# Patient Record
Sex: FEMALE | Race: BLACK OR AFRICAN AMERICAN | Employment: FULL TIME | URBAN - METROPOLITAN AREA
[De-identification: names, ages, dates, MRNs, and addresses within clinical notes are randomized per-mention and may not be internally consistent; named-entity substitution may affect disease eponyms.]

---

## 2018-09-25 ENCOUNTER — OFFICE VISIT (OUTPATIENT)
Dept: INTERNAL MEDICINE CLINIC | Age: 23
End: 2018-09-25
Payer: COMMERCIAL

## 2018-09-25 VITALS
SYSTOLIC BLOOD PRESSURE: 120 MMHG | OXYGEN SATURATION: 99 % | TEMPERATURE: 98.3 F | HEIGHT: 62 IN | BODY MASS INDEX: 32.39 KG/M2 | HEART RATE: 85 BPM | WEIGHT: 176 LBS | DIASTOLIC BLOOD PRESSURE: 82 MMHG

## 2018-09-25 DIAGNOSIS — K21.9 GASTROESOPHAGEAL REFLUX DISEASE, ESOPHAGITIS PRESENCE NOT SPECIFIED: Primary | ICD-10-CM

## 2018-09-25 DIAGNOSIS — K59.04 CHRONIC IDIOPATHIC CONSTIPATION: ICD-10-CM

## 2018-09-25 DIAGNOSIS — Z23 NEED FOR INFLUENZA VACCINATION: ICD-10-CM

## 2018-09-25 DIAGNOSIS — Z12.4 CERVICAL CANCER SCREENING: ICD-10-CM

## 2018-09-25 PROCEDURE — 99203 OFFICE O/P NEW LOW 30 MIN: CPT | Performed by: NURSE PRACTITIONER

## 2018-09-25 RX ORDER — POLYETHYLENE GLYCOL 3350 17 G/17G
17 POWDER, FOR SOLUTION ORAL DAILY
COMMUNITY
End: 2018-11-13

## 2018-09-25 RX ORDER — PANTOPRAZOLE SODIUM 40 MG/1
40 TABLET, DELAYED RELEASE ORAL DAILY
COMMUNITY
End: 2018-11-13 | Stop reason: ALTCHOICE

## 2018-09-25 RX ORDER — OMEPRAZOLE 40 MG/1
40 CAPSULE, DELAYED RELEASE ORAL DAILY
Qty: 30 CAPSULE | Refills: 1 | Status: SHIPPED | OUTPATIENT
Start: 2018-09-25

## 2018-09-25 RX ORDER — RANITIDINE 150 MG/1
150 TABLET ORAL 2 TIMES DAILY
Qty: 60 TABLET | Refills: 1 | Status: SHIPPED | OUTPATIENT
Start: 2018-09-25

## 2018-09-25 ASSESSMENT — ENCOUNTER SYMPTOMS
VOICE CHANGE: 1
ABDOMINAL PAIN: 1
DIARRHEA: 1
CONSTIPATION: 1
BLOOD IN STOOL: 0
NAUSEA: 1
VOMITING: 0

## 2018-09-25 NOTE — PATIENT INSTRUCTIONS
Assessment/Plan:     1. Gastroesophageal reflux disease, esophagitis presence not specified  Take omeprazole 40mg once daily  Add ranitidine 150mg 1 to 2 times a day    - Laurent Núñez MD (SKYLAR)    2. Chronic idiopathic constipation  Continue Miralax for the days you have constipation. Can use up to twice daily. You can add a stool softener nightly. Increase your water intake. - Laurent Núñez MD (SKYLAR)    3. Need for influenza vaccination  Please have this done soon. 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 4. Cervical cancer screening  Please schedule this soon. - Calvin Syed MD (SKYLAR)      Discussed medications with patient, who voiced understanding of their use and indications. All questions answered. Pt is advised to call if symptoms worsen or do not improve.

## 2018-09-25 NOTE — PROGRESS NOTES
Subjective:   Susan Miller is a 20 y.o. female. YOB: 1995    Date of Visit:  9/25/2018    Chief Complaint   Patient presents with    Gastroesophageal Reflux     went  to [facility]   new  patient    Bloated       HPI     Pt is new to this practice. There are no record noted in Care Everywhere as it is showing duplicate pts. Pt tells me she has been treated for this since 13 yo. Never had an EGD. She was taking Prilosec 20mg QD x 14 days, twice. Still didn't resolve her sxs. Tries to avoid spicy, acidic foods, but still has pizza. She went to [facility] ED last month with 2 week hx of heartburn. Rx'd Protonix. Went yesterday with bloating. Full feeling that won't go away. Constipated - only having BM once weekly. Rx'd Miralax in the ED. Can vomit and have diarrhea when her sxs are severe. Mom with GERD, siblings as well. Ran out of Protonix. Taking Pepto-Bismol as of late. No recent pap. Needs GYN. Past Medical History:   Diagnosis Date    Acid reflux       Family History   Problem Relation Age of Onset    High Blood Pressure Father      Social History   Substance Use Topics    Smoking status: Never Smoker    Smokeless tobacco: Never Used    Alcohol use No     No Known Allergies     Outpatient Prescriptions Marked as Taking for the 9/25/18 encounter (Office Visit) with MARGO Watson CNP   Medication Sig Dispense Refill    pantoprazole (PROTONIX) 40 MG tablet Take 40 mg by mouth daily      polyethylene glycol (MIRALAX) powder Take 17 g by mouth daily         Health Maintenance Due   Topic    HIV screen     Cervical cancer screen     Flu vaccine (1)       Diagnostics (if applicable):    Review of Systems   Constitutional: Negative for fever. HENT: Positive for voice change. Gastrointestinal: Positive for abdominal pain, constipation, diarrhea and nausea. Negative for blood in stool and vomiting.            Physical Exam   Constitutional: She appears if symptoms worsen or do not improve.

## 2018-11-14 ENCOUNTER — ANESTHESIA EVENT (OUTPATIENT)
Dept: ENDOSCOPY | Age: 23
End: 2018-11-14
Payer: COMMERCIAL

## 2018-11-30 ENCOUNTER — HOSPITAL ENCOUNTER (OUTPATIENT)
Age: 23
Setting detail: OUTPATIENT SURGERY
Discharge: HOME | End: 2018-11-30
Attending: INTERNAL MEDICINE | Admitting: INTERNAL MEDICINE
Payer: COMMERCIAL

## 2018-11-30 ENCOUNTER — CLINICAL DOCUMENTATION (OUTPATIENT)
Dept: INTERNAL MEDICINE CLINIC | Age: 23
End: 2018-11-30

## 2018-11-30 ENCOUNTER — ANESTHESIA (OUTPATIENT)
Dept: ENDOSCOPY | Age: 23
End: 2018-11-30
Payer: COMMERCIAL

## 2018-11-30 VITALS
DIASTOLIC BLOOD PRESSURE: 75 MMHG | SYSTOLIC BLOOD PRESSURE: 116 MMHG | HEIGHT: 63 IN | TEMPERATURE: 97.9 F | WEIGHT: 181 LBS | OXYGEN SATURATION: 100 % | BODY MASS INDEX: 32.07 KG/M2 | RESPIRATION RATE: 16 BRPM | HEART RATE: 89 BPM

## 2018-11-30 VITALS
SYSTOLIC BLOOD PRESSURE: 110 MMHG | OXYGEN SATURATION: 98 % | DIASTOLIC BLOOD PRESSURE: 66 MMHG | RESPIRATION RATE: 10 BRPM

## 2018-11-30 LAB — HCG(URINE) PREGNANCY TEST: NEGATIVE

## 2018-11-30 PROCEDURE — 7100000011 HC PHASE II RECOVERY - ADDTL 15 MIN: Performed by: INTERNAL MEDICINE

## 2018-11-30 PROCEDURE — 2709999900 HC NON-CHARGEABLE SUPPLY: Performed by: INTERNAL MEDICINE

## 2018-11-30 PROCEDURE — 7100000010 HC PHASE II RECOVERY - FIRST 15 MIN: Performed by: INTERNAL MEDICINE

## 2018-11-30 PROCEDURE — 3609017100 HC EGD: Performed by: INTERNAL MEDICINE

## 2018-11-30 PROCEDURE — 3700000000 HC ANESTHESIA ATTENDED CARE: Performed by: INTERNAL MEDICINE

## 2018-11-30 PROCEDURE — 2580000003 HC RX 258: Performed by: ANESTHESIOLOGY

## 2018-11-30 PROCEDURE — 6360000002 HC RX W HCPCS: Performed by: NURSE ANESTHETIST, CERTIFIED REGISTERED

## 2018-11-30 PROCEDURE — 7100000000 HC PACU RECOVERY - FIRST 15 MIN: Performed by: INTERNAL MEDICINE

## 2018-11-30 PROCEDURE — 2500000003 HC RX 250 WO HCPCS: Performed by: NURSE ANESTHETIST, CERTIFIED REGISTERED

## 2018-11-30 PROCEDURE — 84703 CHORIONIC GONADOTROPIN ASSAY: CPT

## 2018-11-30 RX ORDER — SODIUM CHLORIDE 9 MG/ML
INJECTION, SOLUTION INTRAVENOUS CONTINUOUS
Status: DISCONTINUED | OUTPATIENT
Start: 2018-11-30 | End: 2018-11-30 | Stop reason: HOSPADM

## 2018-11-30 RX ORDER — SODIUM CHLORIDE 0.9 % (FLUSH) 0.9 %
10 SYRINGE (ML) INJECTION EVERY 12 HOURS SCHEDULED
Status: DISCONTINUED | OUTPATIENT
Start: 2018-11-30 | End: 2018-11-30 | Stop reason: HOSPADM

## 2018-11-30 RX ORDER — PROPOFOL 10 MG/ML
INJECTION, EMULSION INTRAVENOUS PRN
Status: DISCONTINUED | OUTPATIENT
Start: 2018-11-30 | End: 2018-11-30 | Stop reason: SDUPTHER

## 2018-11-30 RX ORDER — SODIUM CHLORIDE 0.9 % (FLUSH) 0.9 %
10 SYRINGE (ML) INJECTION PRN
Status: DISCONTINUED | OUTPATIENT
Start: 2018-11-30 | End: 2018-11-30 | Stop reason: HOSPADM

## 2018-11-30 RX ORDER — LIDOCAINE HYDROCHLORIDE 10 MG/ML
1 INJECTION, SOLUTION EPIDURAL; INFILTRATION; INTRACAUDAL; PERINEURAL
Status: DISCONTINUED | OUTPATIENT
Start: 2018-11-30 | End: 2018-11-30 | Stop reason: HOSPADM

## 2018-11-30 RX ORDER — LIDOCAINE HYDROCHLORIDE 20 MG/ML
INJECTION, SOLUTION INFILTRATION; PERINEURAL PRN
Status: DISCONTINUED | OUTPATIENT
Start: 2018-11-30 | End: 2018-11-30 | Stop reason: SDUPTHER

## 2018-11-30 RX ADMIN — PROPOFOL 200 MG: 10 INJECTION, EMULSION INTRAVENOUS at 07:27

## 2018-11-30 RX ADMIN — LIDOCAINE HYDROCHLORIDE 100 MG: 20 INJECTION, SOLUTION INFILTRATION; PERINEURAL at 07:25

## 2018-11-30 RX ADMIN — PROPOFOL 200 MG: 10 INJECTION, EMULSION INTRAVENOUS at 07:28

## 2018-11-30 RX ADMIN — SODIUM CHLORIDE: 9 INJECTION, SOLUTION INTRAVENOUS at 06:47

## 2018-11-30 ASSESSMENT — PAIN - FUNCTIONAL ASSESSMENT: PAIN_FUNCTIONAL_ASSESSMENT: 0-10

## 2018-11-30 ASSESSMENT — ENCOUNTER SYMPTOMS: SHORTNESS OF BREATH: 0

## 2018-11-30 ASSESSMENT — PAIN SCALES - GENERAL: PAINLEVEL_OUTOF10: 0

## 2018-11-30 NOTE — ANESTHESIA PRE PROCEDURE
3 Encounters:   11/30/18 112/68   09/25/18 120/82       NPO Status: Time of last liquid consumption: 2000                        Time of last solid consumption: 1900                        Date of last liquid consumption: 11/29/18                        Date of last solid food consumption: 11/29/18    BMI:   Wt Readings from Last 3 Encounters:   11/30/18 181 lb (82.1 kg)   09/25/18 176 lb (79.8 kg)     Body mass index is 32.06 kg/m². CBC: No results found for: WBC, RBC, HGB, HCT, MCV, RDW, PLT    CMP: No results found for: NA, K, CL, CO2, BUN, CREATININE, GFRAA, AGRATIO, LABGLOM, GLUCOSE, PROT, CALCIUM, BILITOT, ALKPHOS, AST, ALT    POC Tests: No results for input(s): POCGLU, POCNA, POCK, POCCL, POCBUN, POCHEMO, POCHCT in the last 72 hours. Coags: No results found for: PROTIME, INR, APTT    HCG (If Applicable):   Lab Results   Component Value Date    PREGTESTUR Negative 11/30/2018        ABGs: No results found for: PHART, PO2ART, IKM9BYN, JGK3VMR, BEART, T1ZTMKRB     Type & Screen (If Applicable):  No results found for: LABABO, [address]    Anesthesia Evaluation  Patient summary reviewed and Nursing notes reviewed no history of anesthetic complications:   Airway: Mallampati: I  TM distance: >3 FB   Neck ROM: full  Mouth opening: > = 3 FB Dental: normal exam         Pulmonary: breath sounds clear to auscultation      (-) asthma and shortness of breath                           Cardiovascular:  Exercise tolerance: good (>4 METS),       (-) hypertension and  angina      Rhythm: regular  Rate: normal                    Neuro/Psych:               GI/Hepatic/Renal:   (+) GERD:,           Endo/Other:        (-) diabetes mellitus               Abdominal:           Vascular:                                        Anesthesia Plan      MAC     ASA 1       Induction: intravenous. Anesthetic plan and risks discussed with patient. Plan discussed with CRNA. Plan for MAC with standard ASA monitoring.

## 2018-11-30 NOTE — PROGRESS NOTES
Sedation provided per anesthesia. See anesthesia record for medication administration and vitals.
To endo recovery from procedure room. VSS, pt is not arousible, and  respirations easy and unlabored.
jewelry must be removed. 11. If you have ___dentures, they will be removed before going to the OR; we will provide you a container. If you wear ___contact lenses or ___glasses, they will be removed; please bring a case for them. 12. Please see your family doctor/pediatrician for a history & physical and/or concerning medications. Bring any test results/reports from your physician's office. PCP__________________Phone___________H&P Appt. Date________             13 If you  have a Living Will and Durable Power of  for Healthcare, please bring in a copy. X           14. Notify your Surgeon if you develop any illness between now and surgery  time, cough, cold, fever, sore throat, nausea, vomiting, etc.  Please notify your surgeon if you experience dizziness, shortness of breath or blurred vision between now & the time of your surgery             15. DO NOT shave your operative site 96 hours prior to surgery. For face & neck surgery, men may use an electric razor 48 hours prior to surgery. 16. Shower the night before surgery with ___Antibacterial soap ___Hibiclens             17. To provide excellent care visitors will be limited to one in the room at any given time. X           18. Please bring picture ID and insurance card. 19.  Visit our web site for additional information:  [email or web address]              20.During flu season no children under the age of 15 are permitted in the hospital for the safety of all patients. 21. If you take a long acting insulin in the evening only  take half of your usual  dose the night  before your procedure              22. If you use a c-pap please bring DOS if staying overnight,             23.For your convenience [id number] [facility] has a pharmacy on site to fill your prescriptions. 24. If you use oxygen and have a portable tank please bring it  with you the DOS  X           25.  Bring a

## 2018-12-20 ENCOUNTER — HOSPITAL ENCOUNTER (OUTPATIENT)
Dept: ENDOSCOPY | Age: 23
Discharge: HOME | End: 2018-12-20
Payer: COMMERCIAL

## 2018-12-20 PROCEDURE — 3609015500 HC GASTRIC/DUODENAL MOTILITY &/OR MANOMETRY STUDY

## 2019-02-27 ENCOUNTER — TELEPHONE (OUTPATIENT)
Dept: INTERNAL MEDICINE CLINIC | Age: 24
End: 2019-02-27

## (undated) DEVICE — BW-412T DISP COMBO CLEANING BRUSH: Brand: SINGLE USE COMBINATION CLEANING BRUSH

## (undated) DEVICE — SOLUTION IV IRRIG WATER 500ML POUR BRL ST 2F7113

## (undated) DEVICE — PROCEDURE KIT ENDOSCP CUST

## (undated) DEVICE — MOUTHPIECE ENDOSCP L CTRL OPN AND SIDE PORTS DISP